# Patient Record
Sex: FEMALE | Race: WHITE | Employment: UNEMPLOYED | URBAN - METROPOLITAN AREA
[De-identification: names, ages, dates, MRNs, and addresses within clinical notes are randomized per-mention and may not be internally consistent; named-entity substitution may affect disease eponyms.]

---

## 2017-05-15 ENCOUNTER — HOSPITAL ENCOUNTER (OUTPATIENT)
Dept: MAMMOGRAPHY | Age: 62
Discharge: HOME | End: 2017-05-15
Attending: OBSTETRICS & GYNECOLOGY
Payer: COMMERCIAL

## 2017-05-15 DIAGNOSIS — Z12.39 SCREENING FOR BREAST CANCER: ICD-10-CM

## 2017-05-15 PROCEDURE — 77067 SCR MAMMO BI INCL CAD: CPT

## 2017-08-08 PROBLEM — R31.29 MICROSCOPIC HEMATURIA: Status: ACTIVE | Noted: 2017-08-08

## 2017-09-06 ENCOUNTER — HOSPITAL ENCOUNTER (OUTPATIENT)
Dept: PHYSICAL THERAPY | Age: 62
Discharge: HOME | End: 2017-09-06
Attending: OBSTETRICS & GYNECOLOGY
Payer: COMMERCIAL

## 2017-09-06 DIAGNOSIS — N99.3 PROLAPSE OF VAGINAL VAULT AFTER HYSTERECTOMY: ICD-10-CM

## 2017-09-06 PROCEDURE — 97161 PT EVAL LOW COMPLEX 20 MIN: CPT

## 2017-09-06 NOTE — PROGRESS NOTES
Ambulatory/Rehab Services H2 Model Falls Risk Assessment    Risk Factor Pts. ·   Confusion/Disorientation/Impulsivity  []    4 ·   Symptomatic Depression  []   2 ·   Altered Elimination  []   1 ·   Dizziness/Vertigo  []   1 ·   Gender (Male)  []   1 ·   Any administered antiepileptics (anticonvulsants):  []   2 ·   Any administered benzodiazepines:  []   1 ·   Visual Impairment (specify):  []   1 ·   Portable Oxygen Use  []   1 ·   Orthostatic ? BP  []   1 ·   History of Recent Falls (within 3 mos.)  []   5     Ability to Rise from Chair (choose one) Pts. ·   Ability to rise in a single movement  [x]   0 ·   Pushes up, successful in one attempt  []   1 ·   Multiple attempts, but successful  []   3 ·   Unable to rise without assistance  []   4   Total: (5 or greater = High Risk) 0     Falls Prevention Plan:   []                Physical Limitations to Exercise (specify):   []                Mobility Assistance Device (type):   []                Exercise/Equipment Adaptation (specify):    ©2010 [employer] of [address] Patent #[id number].  Federal Law prohibits the replication, distribution or use without written permission from [employer]

## 2017-09-06 NOTE — PROGRESS NOTES
[facility]  : 1955 [facility] [place] [id number] [facility], [address] [place] [address],[address], [place] [address].  [phone]   [phone]          OUTPATIENT PHYSICAL THERAPY:Initial Assessment 2017    ICD-10: Treatment Diagnosis: Muscle weakness (generalized) (M62.81)  Precautions/Allergies:   Review of patient's allergies indicates no known allergies. Fall Risk Score: 0 (? 5 = High Risk)  MD Orders: Evaluate and treat MEDICAL/REFERRING DIAGNOSIS:  Prolapse of vaginal vault after hysterectomy [N99.3]   DATE OF ONSET: 6 mos  REFERRING PHYSICIAN: Jose Clements MD  RETURN PHYSICIAN APPOINTMENT: unknown     INITIAL ASSESSMENT:  Ms. Aguilar presents with decreased strength and endurance of PFmm with PERF score of 2/3/5 likely contributing to her lack of vaginal vault support s/p hysterectomy. She has tendency of breath holding and use of abdominal accessory muscles when performing kegel but able to correct with cues. Pt will benefit from physical therapy to address stated problems. Plan of care was discussed and agreed upon with patient and HEP was initiated. Thank you for the opportunity to work with this patient. PROBLEM LIST (Impacting functional limitations):  1. Decreased strength of pelvic floor which limits bladder control INTERVENTIONS PLANNED:  1. Neuromuscular re-education  2. Biofeedback as needed  3. HEP  4. Bladder retraining  5. Bladder education  6. Manual Therapy  7. Therapeutic Activites  8. Therapeutic Exercise/Strengthening   TREATMENT PLAN:  Effective Dates: 17 TO 17. Frequency/Duration: 1 time a week for 12 weeks  GOALS: (Goals have been discussed and agreed upon with patient.)  Short-Term Functional Goals: Time Frame: 4 weeks  1. Patient will demonstrate independence with home exercise program.  2. Pt will demonstrate ability to perform isolated PFmm contraction without overflow accessory use for improved efficiency strengthening.   Discharge Goals: Time Frame: 12 weeks  1. Pt will score 4-5 on PFIQ7 for overall functional improvement. 2. Pt will demonstrate improvement in PFmm strength to 3/10/10 for improved vaginal vault support. 3. Pt will report 75% improvement in prolapse sensations with lifting and exercise to allow her to care for grandchildren without sxm exacerbation  Rehabilitation Potential For Stated Goals: Good  Regarding [address] therapy, I certify that the treatment plan above will be carried out by a therapist or under their direction. Thank you for this referral,  Lance Lebron PT     Referring Physician Signature: Molly Valenzuela MD              Date                    The information in this section was collected on 09/06/17  (except where otherwise noted). HISTORY:   History of Present Injury/Illness (Reason for Referral):  Hysterectomy due to prolapsed uterus 12/16. She states she normally doesn't have a problem with leaking but can have some difficulty making it to the bathroom in time. After hysterectomy she felt great until recently. Had grandchildren visit and lifted them a lot which she feels aggravated symptoms. She states she can sometimes stop flow of urine and sometimes can't. Also sometimes stream is decreased but this fluctuates. Trying to avoid surgery. Past Medical History/Comorbidities:   Ms. Aguilar  has a past medical history of Chicken pox; Depression; H/O abnormal Pap smear; Mumps; Uterine prolapse; and Vaginal venereal warts. Ms. Aguilar  has a past surgical history that includes orthopaedic; colonoscopy; gyn; and hysterectomy (12/12/2016). Social History/Living Environment:     lives with   Prior Level of Function/Work/Activity:  Not working. 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 Doesn't do aerobic ex's bc feels it makes her problem worse.     Previous Treatment Approaches:          Prior hysterectomy for prolapse  Personal Factors:          Sex:  female        Age:  57 y.o.      Current Medications:    Current Outpatient Prescriptions:     estradiol (ESTRACE) 1 mg tablet, TAKE 1 TABLET DAILY, Disp: 90 Tab, Rfl: 4    omega-3 fatty acids-vitamin e (FISH OIL) 1,000 mg cap, Take 2 Caps by mouth daily. Per anesthesia protocol: pt instructed to stop med 7 days prior to day of surgery. , Disp: , Rfl:     Biotin 2,500 mcg cap, Take 1 Tab by mouth daily. Per anesthesia protocol: pt instructed to stop med 7 days prior to day of surgery. , Disp: , Rfl:    Date Last Reviewed:  09/06/17   Gynecological History:   · Number of pregnancies: 2, vaginal 2, C-sections 0  · Episiotomy: yes on both. Larger tearing with first delivery  Past Urinary Medical History:    · History of UTI, Menopause: no hx of UTI; finished menopause 15 yrs ago  · Previous Treatments: hysterectomy  Incontinence History:  PROBLEM: YES/NO: COMMENTS:   Loss of urine with coughing NO    Loss of urine with lifting  NO    Loss of urine with exercise, running NO    Loss of urine with strong urge NO    Loss of urine with approaching the bathroom NO    Loss of urine with key in lock NO    Loss of urine as getting to toilet/removing clothing NO    Loss of urine when hearing running water NO    Have difficulty initiating a urine stream NO    Have difficulty stopping urine stream YES    Have to strain to empty bladder NO Sometimes slow stream   Dribble urine when urinating NO    Dribble urine after emptying bladder NO    Experience pain with urination NO    Experience burning during urination NO    Have blood in urine NO      Voiding Patterns:  Patient voids every 2 hours during the day and 1 times during the night. Patient reports that she empties bladder fully. Fluid Intake:  Patient drinks 6-8 cups of fluid per day. She consumes 1-2 cups of caffeinated beverages per day. Patient does not limit fluid intake to control bladder. Bowel Habits:  Patient demonstrates normal bowel habits.   Mobility / Self Care: independent  Personal / Social History:  · Sexually active? YES:   · Social activities restricted due to urinary incontinence? YES:       Number of Personal Factors/Comorbidities that affect the Plan of Care: 1-2: MODERATE COMPLEXITY   EXAMINATION:   External Observation:   · Voluntary Contraction: present  · Voluntary Relaxation: present  · Involuntary Contraction: present  · Involuntary Relaxation: present  · Perineal Body Assessment: mild thinning  · Skin Integrity: normal  · Q-tip Test: no tenderness  · Vaginal Vault Size: within normal limits    [place] PERFECT (Power/Endurnace/Repetitions/Fast Twitch/Elevation/Co-contraction/Timing) Scale:   · [place] PERFECT = 2/3/5/9//  · Tissue support test with bearing down:  Grade 1: not visible at introitus; Grade 2: visible at introitus; Grade 3: tissue outside introitus  · Anterior Wall = 1   · Posterior Wall = 1   · Apical = na   · Palpation:no tenderness   Right Left   Bulbocavernosus     Ischocavernosus     Superficial Transverse Perineal     Sphincter Urethrae     Compressor Urethra      Urethra-vaginalis     Deep Transverse Perineium     Obturator Internus     Iliococcygeus     Coccygeus     Pubococcygeus     Levator Ani     Adductor     Psoas           Body Structures Involved:  1. Nerves  2. Muscles  3. Ligaments Body Functions Affected:  1. Mental  2. Sensory/Pain  3. Genitourinary  4. Neuromusculoskeletal  5. Movement Related Activities and Participation Affected:  1. Learning and Applying Knowledge  2. General Tasks and Demands  3. Mobility  4. Self Care  5. Interpersonal Interactions and Relationships   Number of elements that affect the Plan of Care: 1-2: LOW COMPLEXITY   CLINICAL PRESENTATION:   Presentation: Stable and uncomplicated: LOW COMPLEXITY   CLINICAL DECISION MAKING:   Outcome Measure:    Tool Used: Pelvic Floor Impact Questionnaire--short form 7 (PFIQ-7)   Score:  Initial: 9.52%  · Bladder or Urine: 9.52  · Bowel or Rectum: 9.52  · Vagina or Pelvis: 9.52 Most Recent: X (Date: -- )  · Bladder or Urine: X  · Bowel or Rectum: X  · Vagina or Pelvis: X   Interpretation of Score: Each of the 7 sections is scored on a scale from 0-3; 0 representing \"Not at all\", 3 representing \"Quite a bit\". The mean value is taken from all the answered items, then multiplied by 100 to obtain the scale score, ranging from 0-100. This process is repeated for each column representing bowel, bladder, and pelvic pain. ? Self Care:     - CURRENT STATUS: CI - 1%-19% impaired, limited or restricted    - GOAL STATUS: CI - 1%-19% impaired, limited or restricted    - D/C STATUS:  ---------------To be determined---------------       Medical Necessity:   · Patient demonstrates good rehab potential due to higher previous functional level. Reason for Services/Other Comments:  · Patient continues to require skilled intervention due to ongoing goals noted above. Use of outcome tool(s) and clinical judgement create a POC that gives a: Clear prediction of patient's progress: LOW COMPLEXITY   TREATMENT:   (In addition to Assessment/Re-Assessment sessions the following treatments were rendered)  Pre-treatment Symptoms/Complaints:  C/o difficulty caring for her grandkids and picking them up d/t prolapse symptoms  Pain: Initial:   Pain Intensity 1: 0  Post Session:  0     THERAPEUTIC EXERCISE: (  minutes):  Exercises per grid below to improve strength and coordination. Required minimal verbal and tactile cues to promote proper body mechanics and promote proper body breathing techniques. Progressed resistance and repetitions as indicated.     Date:  9/6/17 Date:   Date:     Activity/Exercise Parameters Parameters Parameters   Isometric PFmm 5sec 10x                                              Exercises:  Patient instructed in pelvic floor exercises listed below:  5/10 TID  The following educational topics were reviewed with patient:  Bladder health, tips to control urge, bladder diary, pelvic floor anatomy, how foods affect bladder, bladder retraining. Treatment/Session Assessment:    Response to Treatment:  Pt reported good understanding of plan of care as well as exercises. All questions were answered and pt was invited to call with any further questions or issues  · Compliance with Program/Exercises: Will assess as treatment progresses. · Recommendations/Intent for next treatment session: \"Next visit will focus on advancements to more challenging activities\".   Total Treatment Duration:  PT Patient Time In/Time Out  Time In: 1330  Time Out: 2919 [facility]

## 2017-09-22 ENCOUNTER — HOSPITAL ENCOUNTER (OUTPATIENT)
Dept: PHYSICAL THERAPY | Age: 62
Discharge: HOME | End: 2017-09-22
Attending: OBSTETRICS & GYNECOLOGY
Payer: COMMERCIAL

## 2017-09-22 DIAGNOSIS — N99.3 PROLAPSE OF VAGINAL VAULT AFTER HYSTERECTOMY: ICD-10-CM

## 2017-09-22 PROCEDURE — 97110 THERAPEUTIC EXERCISES: CPT

## 2017-09-22 NOTE — PROGRESS NOTES
[facility]  : 1955 Therapy Center at [place] [address] [facility], [address] [place] [address],[address], [facility]  [phone]   [phone]          OUTPATIENT PHYSICAL THERAPY:Daily Note 2017    ICD-10: Treatment Diagnosis: Muscle weakness (generalized) (M62.81)  Precautions/Allergies:   Review of patient's allergies indicates no known allergies. Fall Risk Score: 0 (? 5 = High Risk)  MD Orders: Evaluate and treat MEDICAL/REFERRING DIAGNOSIS:  Prolapse of vaginal vault after hysterectomy [N99.3]   DATE OF ONSET: 6 mos  REFERRING PHYSICIAN: Tate Clements MD  RETURN PHYSICIAN APPOINTMENT: unknown     INITIAL ASSESSMENT:  Ms. Aguilar presents with decreased strength and endurance of PFmm with PERF score of 2/3/5 likely contributing to her lack of vaginal vault support s/p hysterectomy. She has tendency of breath holding and use of abdominal accessory muscles when performing kegel but able to correct with cues. Pt will benefit from physical therapy to address stated problems. Plan of care was discussed and agreed upon with patient and HEP was initiated. Thank you for the opportunity to work with this patient. PROBLEM LIST (Impacting functional limitations):  1. Decreased strength of pelvic floor which limits bladder control INTERVENTIONS PLANNED:  1. Neuromuscular re-education  2. Biofeedback as needed  3. HEP  4. Bladder retraining  5. Bladder education  6. Manual Therapy  7. Therapeutic Activites  8. Therapeutic Exercise/Strengthening   TREATMENT PLAN:  Effective Dates: 17 TO 17. Frequency/Duration: 1 time a week for 12 weeks  GOALS: (Goals have been discussed and agreed upon with patient.)  Short-Term Functional Goals: Time Frame: 4 weeks  1. Patient will demonstrate independence with home exercise program.  2. Pt will demonstrate ability to perform isolated PFmm contraction without overflow accessory use for improved efficiency strengthening.   Discharge Goals: Time Frame: 12 weeks  1. Pt will score 4-5 on PFIQ7 for overall functional improvement. 2. Pt will demonstrate improvement in PFmm strength to 3/10/10 for improved vaginal vault support. 3. Pt will report 75% improvement in prolapse sensations with lifting and exercise to allow her to care for grandchildren without sxm exacerbation  Rehabilitation Potential For Stated Goals: Good  Regarding [address] therapy, I certify that the treatment plan above will be carried out by a therapist or under their direction. Thank you for this referral,  Gage Sewell PT     Referring Physician Signature: Stacie Richmond MD              Date                    The information in this section was collected on 09/22/17  (except where otherwise noted). HISTORY:   History of Present Injury/Illness (Reason for Referral):  Hysterectomy due to prolapsed uterus 12/16. She states she normally doesn't have a problem with leaking but can have some difficulty making it to the bathroom in time. After hysterectomy she felt great until recently. Had grandchildren visit and lifted them a lot which she feels aggravated symptoms. She states she can sometimes stop flow of urine and sometimes can't. Also sometimes stream is decreased but this fluctuates. Trying to avoid surgery. Past Medical History/Comorbidities:   Ms. [facility]  has a past medical history of Chicken pox; Depression; H/O abnormal Pap smear; Mumps; Uterine prolapse; and Vaginal venereal warts. Ms. [facility]  has a past surgical history that includes orthopaedic; colonoscopy; gyn; and hysterectomy (12/12/2016). Social History/Living Environment:     lives with   Prior Level of Function/Work/Activity:  Not working. 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 Doesn't do aerobic ex's bc feels it makes her problem worse.     Previous Treatment Approaches:          Prior hysterectomy for prolapse  Personal Factors:          Sex:  female        Age:  57 y.o.      Current Medications:    Current Outpatient Prescriptions:     estradiol (ESTRACE) 1 mg tablet, TAKE 1 TABLET DAILY, Disp: 90 Tab, Rfl: 4    omega-3 fatty acids-vitamin e (FISH OIL) 1,000 mg cap, Take 2 Caps by mouth daily. Per anesthesia protocol: pt instructed to stop med 7 days prior to day of surgery. , Disp: , Rfl:     Biotin 2,500 mcg cap, Take 1 Tab by mouth daily. Per anesthesia protocol: pt instructed to stop med 7 days prior to day of surgery. , Disp: , Rfl:    Date Last Reviewed:  09/22/17   Gynecological History:   · Number of pregnancies: 2, vaginal 2, C-sections 0  · Episiotomy: yes on both. Larger tearing with first delivery  Past Urinary Medical History:    · History of UTI, Menopause: no hx of UTI; finished menopause 15 yrs ago  · Previous Treatments: hysterectomy  Incontinence History:  PROBLEM: YES/NO: COMMENTS:   Loss of urine with coughing NO    Loss of urine with lifting  NO    Loss of urine with exercise, running NO    Loss of urine with strong urge NO    Loss of urine with approaching the bathroom NO    Loss of urine with key in lock NO    Loss of urine as getting to toilet/removing clothing NO    Loss of urine when hearing running water NO    Have difficulty initiating a urine stream NO    Have difficulty stopping urine stream YES    Have to strain to empty bladder NO Sometimes slow stream   Dribble urine when urinating NO    Dribble urine after emptying bladder NO    Experience pain with urination NO    Experience burning during urination NO    Have blood in urine NO      Voiding Patterns:  Patient voids every 2 hours during the day and 1 times during the night. Patient reports that she empties bladder fully. Fluid Intake:  Patient drinks 6-8 cups of fluid per day. She consumes 1-2 cups of caffeinated beverages per day. Patient does not limit fluid intake to control bladder. Bowel Habits:  Patient demonstrates normal bowel habits.   Mobility / Self Care: independent  Personal / Social History:  · Sexually active? YES:   · Social activities restricted due to urinary incontinence? YES:       Number of Personal Factors/Comorbidities that affect the Plan of Care: 1-2: MODERATE COMPLEXITY   EXAMINATION:   External Observation:   · Voluntary Contraction: present  · Voluntary Relaxation: present  · Involuntary Contraction: present  · Involuntary Relaxation: present  · Perineal Body Assessment: mild thinning  · Skin Integrity: normal  · Q-tip Test: no tenderness  · Vaginal Vault Size: within normal limits    [place] PERFECT (Power/Endurnace/Repetitions/Fast Twitch/Elevation/Co-contraction/Timing) Scale:   · [place] PERFECT = 2/3/5/9//  · Tissue support test with bearing down:  Grade 1: not visible at introitus; Grade 2: visible at introitus; Grade 3: tissue outside introitus  · Anterior Wall = 1   · Posterior Wall = 1   · Apical = na   · Palpation:no tenderness   Right Left   Bulbocavernosus     Ischocavernosus     Superficial Transverse Perineal     Sphincter Urethrae     Compressor Urethra      Urethra-vaginalis     Deep Transverse Perineium     Obturator Internus     Iliococcygeus     Coccygeus     Pubococcygeus     Levator Ani     Adductor     Psoas           Body Structures Involved:  1. Nerves  2. Muscles  3. Ligaments Body Functions Affected:  1. Mental  2. Sensory/Pain  3. Genitourinary  4. Neuromusculoskeletal  5. Movement Related Activities and Participation Affected:  1. Learning and Applying Knowledge  2. General Tasks and Demands  3. Mobility  4. Self Care  5. Interpersonal Interactions and Relationships   Number of elements that affect the Plan of Care: 1-2: LOW COMPLEXITY   CLINICAL PRESENTATION:   Presentation: Stable and uncomplicated: LOW COMPLEXITY   CLINICAL DECISION MAKING:   Outcome Measure:    Tool Used: Pelvic Floor Impact Questionnaire--short form 7 (PFIQ-7)   Score:  Initial: 9.52%  · Bladder or Urine: 9.52  · Bowel or Rectum: 9.52  · Vagina or Pelvis: 9.52 Most Recent: X (Date: -- )  · Bladder or Urine: X  · Bowel or Rectum: X  · Vagina or Pelvis: X   Interpretation of Score: Each of the 7 sections is scored on a scale from 0-3; 0 representing \"Not at all\", 3 representing \"Quite a bit\". The mean value is taken from all the answered items, then multiplied by 100 to obtain the scale score, ranging from 0-100. This process is repeated for each column representing bowel, bladder, and pelvic pain. ? Self Care:     - CURRENT STATUS: CI - 1%-19% impaired, limited or restricted    - GOAL STATUS: CI - 1%-19% impaired, limited or restricted    - D/C STATUS:  ---------------To be determined---------------       Medical Necessity:   · Patient demonstrates good rehab potential due to higher previous functional level. Reason for Services/Other Comments:  · Patient continues to require skilled intervention due to ongoing goals noted above. Use of outcome tool(s) and clinical judgement create a POC that gives a: Clear prediction of patient's progress: LOW COMPLEXITY   TREATMENT:   (In addition to Assessment/Re-Assessment sessions the following treatments were rendered)  Pre-treatment Symptoms/Complaints:  States she is doing ex's. No significant change in prolapse symptoms noted  Pain: Initial:   Pain Intensity 1: 0  Post Session:  0     THERAPEUTIC EXERCISE: (45 minutes):  Exercises per grid below to improve strength and coordination. Required minimal verbal and tactile cues to promote proper body mechanics and promote proper body breathing techniques. Progressed resistance and repetitions as indicated.     Date:  9/6/17 Date:  9/22/17 Date:     Activity/Exercise Parameters Parameters Parameters   Isometric PFmm 5sec 10x biofeedback    Bridge with ball squeeze  10x    Bridge with isometric ER  BTB 10x    Sidelying clams with TB      squats                   biofeedback utilized to assess resting tone, 10/10, 2/4, modulation ex  NMES utilized for increasing muscle strength and hypertrophy 10' 10/10, 50Hz    Exercises:  Patient instructed in pelvic floor exercises listed below:  5/10 TID, 2/4  The following educational topics were reviewed with patient:  Bladder health, tips to control urge, bladder diary, pelvic floor anatomy, how foods affect bladder, bladder retraining. Treatment/Session Assessment:    · Response to Treatment:  Tendency to breath hold today with biofeedback. Improved with cues but needed much concentration. Tolerated initiation of NMES well. Issued BTB and ex's today for HEP. Assess prolonged response  · Compliance with Program/Exercises: Will assess as treatment progresses. · Recommendations/Intent for next treatment session: \"Next visit will focus on advancements to more challenging activities\".   Total Treatment Duration:  PT Patient Time In/Time Out  Time In: 1000  Time Out: 1100  Alex Woodruff PT

## 2017-09-29 ENCOUNTER — APPOINTMENT (OUTPATIENT)
Dept: PHYSICAL THERAPY | Age: 62
End: 2017-09-29
Attending: OBSTETRICS & GYNECOLOGY
Payer: COMMERCIAL

## 2017-10-06 ENCOUNTER — HOSPITAL ENCOUNTER (OUTPATIENT)
Dept: PHYSICAL THERAPY | Age: 62
Discharge: HOME | End: 2017-10-06
Attending: OBSTETRICS & GYNECOLOGY
Payer: COMMERCIAL

## 2017-10-06 DIAGNOSIS — N99.3 PROLAPSE OF VAGINAL VAULT AFTER HYSTERECTOMY: ICD-10-CM

## 2017-10-06 PROCEDURE — 97110 THERAPEUTIC EXERCISES: CPT

## 2017-10-06 NOTE — PROGRESS NOTES
[facility] canceled her 10.20 and 63.96 d/t a conflict. .  She rescheduled in November.

## 2017-10-06 NOTE — PROGRESS NOTES
[facility]  : 1955 [facility] Center at [facility]  [address] [address], [place] [address].  [phone]   [phone]          OUTPATIENT PHYSICAL THERAPY:Daily Note 10/6/2017    ICD-10: Treatment Diagnosis: Muscle weakness (generalized) (M62.81)  Precautions/Allergies:   Review of patient's allergies indicates no known allergies. Fall Risk Score: 0 (? 5 = High Risk)  MD Orders: Evaluate and treat MEDICAL/REFERRING DIAGNOSIS:  Prolapse of vaginal vault after hysterectomy [N99.3]   DATE OF ONSET: 6 mos  REFERRING PHYSICIAN: Heidtman, Dan Severance, MD  RETURN PHYSICIAN APPOINTMENT: unknown     INITIAL ASSESSMENT:  Ms. Korey Villalpando presents with decreased strength and endurance of PFmm with PERF score of 2/3/5 likely contributing to her lack of vaginal vault support s/p hysterectomy. She has tendency of breath holding and use of abdominal accessory muscles when performing kegel but able to correct with cues. Pt will benefit from physical therapy to address stated problems. Plan of care was discussed and agreed upon with patient and HEP was initiated. Thank you for the opportunity to work with this patient. PROBLEM LIST (Impacting functional limitations):  1. Decreased strength of pelvic floor which limits bladder control INTERVENTIONS PLANNED:  1. Neuromuscular re-education  2. Biofeedback as needed  3. HEP  4. Bladder retraining  5. Bladder education  6. Manual Therapy  7. Therapeutic Activites  8. Therapeutic Exercise/Strengthening   TREATMENT PLAN:  Effective Dates: 17 TO 17. Frequency/Duration: 1 time a week for 12 weeks  GOALS: (Goals have been discussed and agreed upon with patient.)  Short-Term Functional Goals: Time Frame: 4 weeks  1. Patient will demonstrate independence with home exercise program.  2. Pt will demonstrate ability to perform isolated PFmm contraction without overflow accessory use for improved efficiency strengthening.   Discharge Goals: Time Frame: 12 weeks  1. Pt will score 4-5 on PFIQ7 for overall functional improvement. 2. Pt will demonstrate improvement in PFmm strength to 3/10/10 for improved vaginal vault support. 3. Pt will report 75% improvement in prolapse sensations with lifting and exercise to allow her to care for grandchildren without sxm exacerbation  Rehabilitation Potential For Stated Goals: Good  Regarding [address] therapy, I certify that the treatment plan above will be carried out by a therapist or under their direction. Thank you for this referral,  Ava Valdez PT     Referring Physician Signature: Imelda Hernandez MD              Date                    The information in this section was collected on 10/06/17  (except where otherwise noted). HISTORY:   History of Present Injury/Illness (Reason for Referral):  Hysterectomy due to prolapsed uterus 12/16. She states she normally doesn't have a problem with leaking but can have some difficulty making it to the bathroom in time. After hysterectomy she felt great until recently. Had grandchildren visit and lifted them a lot which she feels aggravated symptoms. She states she can sometimes stop flow of urine and sometimes can't. Also sometimes stream is decreased but this fluctuates. Trying to avoid surgery. Past Medical History/Comorbidities:   Ms. Mauri Riddle  has a past medical history of Chicken pox; Depression; H/O abnormal Pap smear; Mumps; Uterine prolapse; and Vaginal venereal warts. Ms. Mauri Riddle  has a past surgical history that includes orthopaedic; colonoscopy; gyn; and hysterectomy (12/12/2016). Social History/Living Environment:     lives with   Prior Level of Function/Work/Activity:  Not working. 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 Doesn't do aerobic ex's bc feels it makes her problem worse.     Previous Treatment Approaches:          Prior hysterectomy for prolapse  Personal Factors:          Sex:  female        Age:  57 y.o.      Current Medications:    Current Outpatient Prescriptions:     estradiol (ESTRACE) 1 mg tablet, TAKE 1 TABLET DAILY, Disp: 90 Tab, Rfl: 4    omega-3 fatty acids-vitamin e (FISH OIL) 1,000 mg cap, Take 2 Caps by mouth daily. Per anesthesia protocol: pt instructed to stop med 7 days prior to day of surgery. , Disp: , Rfl:     Biotin 2,500 mcg cap, Take 1 Tab by mouth daily. Per anesthesia protocol: pt instructed to stop med 7 days prior to day of surgery. , Disp: , Rfl:    Date Last Reviewed:  10/06/17   Gynecological History:   · Number of pregnancies: 2, vaginal 2, C-sections 0  · Episiotomy: yes on both. Larger tearing with first delivery  Past Urinary Medical History:    · History of UTI, Menopause: no hx of UTI; finished menopause 15 yrs ago  · Previous Treatments: hysterectomy  Incontinence History:  PROBLEM: YES/NO: COMMENTS:   Loss of urine with coughing NO    Loss of urine with lifting  NO    Loss of urine with exercise, running NO    Loss of urine with strong urge NO    Loss of urine with approaching the bathroom NO    Loss of urine with key in lock NO    Loss of urine as getting to toilet/removing clothing NO    Loss of urine when hearing running water NO    Have difficulty initiating a urine stream NO    Have difficulty stopping urine stream YES    Have to strain to empty bladder NO Sometimes slow stream   Dribble urine when urinating NO    Dribble urine after emptying bladder NO    Experience pain with urination NO    Experience burning during urination NO    Have blood in urine NO      Voiding Patterns:  Patient voids every 2 hours during the day and 1 times during the night. Patient reports that she empties bladder fully. Fluid Intake:  Patient drinks 6-8 cups of fluid per day. She consumes 1-2 cups of caffeinated beverages per day. Patient does not limit fluid intake to control bladder. Bowel Habits:  Patient demonstrates normal bowel habits.   Mobility / Self Care: independent  Personal / Social History:  · Sexually active? YES:   · Social activities restricted due to urinary incontinence? YES:       Number of Personal Factors/Comorbidities that affect the Plan of Care: 1-2: MODERATE COMPLEXITY   EXAMINATION:   External Observation:   · Voluntary Contraction: present  · Voluntary Relaxation: present  · Involuntary Contraction: present  · Involuntary Relaxation: present  · Perineal Body Assessment: mild thinning  · Skin Integrity: normal  · Q-tip Test: no tenderness  · Vaginal Vault Size: within normal limits    [place] PERFECT (Power/Endurnace/Repetitions/Fast Twitch/Elevation/Co-contraction/Timing) Scale:   · [place] PERFECT = 2/3/5/9//  · Tissue support test with bearing down:  Grade 1: not visible at introitus; Grade 2: visible at introitus; Grade 3: tissue outside introitus  · Anterior Wall = 1   · Posterior Wall = 1   · Apical = na   · Palpation:no tenderness   Right Left   Bulbocavernosus     Ischocavernosus     Superficial Transverse Perineal     Sphincter Urethrae     Compressor Urethra      Urethra-vaginalis     Deep Transverse Perineium     Obturator Internus     Iliococcygeus     Coccygeus     Pubococcygeus     Levator Ani     Adductor     Psoas           Body Structures Involved:  1. Nerves  2. Muscles  3. Ligaments Body Functions Affected:  1. Mental  2. Sensory/Pain  3. Genitourinary  4. Neuromusculoskeletal  5. Movement Related Activities and Participation Affected:  1. Learning and Applying Knowledge  2. General Tasks and Demands  3. Mobility  4. Self Care  5. Interpersonal Interactions and Relationships   Number of elements that affect the Plan of Care: 1-2: LOW COMPLEXITY   CLINICAL PRESENTATION:   Presentation: Stable and uncomplicated: LOW COMPLEXITY   CLINICAL DECISION MAKING:   Outcome Measure:    Tool Used: Pelvic Floor Impact Questionnaire--short form 7 (PFIQ-7)   Score:  Initial: 9.52%  · Bladder or Urine: 9.52  · Bowel or Rectum: 9.52  · Vagina or Pelvis: 9.52 Most Recent: X (Date: -- )  · Bladder or Urine: X  · Bowel or Rectum: X  · Vagina or Pelvis: X   Interpretation of Score: Each of the 7 sections is scored on a scale from 0-3; 0 representing \"Not at all\", 3 representing \"Quite a bit\". The mean value is taken from all the answered items, then multiplied by 100 to obtain the scale score, ranging from 0-100. This process is repeated for each column representing bowel, bladder, and pelvic pain. ? Self Care:     - CURRENT STATUS: CI - 1%-19% impaired, limited or restricted    - GOAL STATUS: CI - 1%-19% impaired, limited or restricted    - D/C STATUS:  ---------------To be determined---------------       Medical Necessity:   · Patient demonstrates good rehab potential due to higher previous functional level. Reason for Services/Other Comments:  · Patient continues to require skilled intervention due to ongoing goals noted above. Use of outcome tool(s) and clinical judgement create a POC that gives a: Clear prediction of patient's progress: LOW COMPLEXITY   TREATMENT:   (In addition to Assessment/Re-Assessment sessions the following treatments were rendered)  Pre-treatment Symptoms/Complaints:  States she does not feel pressure of prolapse as much anymore. Can sometimes stop her urine flow and sometimes not. Admits to having difficulty isolating PFmm. Pain: Initial:   Pain Intensity 1: 0  Post Session:  0     THERAPEUTIC EXERCISE: (55 minutes):  Exercises per grid below to improve strength and coordination. Required minimal verbal and tactile cues to promote proper body mechanics and promote proper body breathing techniques. Progressed resistance and repetitions as indicated.     Date:  9/6/17 Date:  9/22/17 Date:  10/6/17   Activity/Exercise Parameters Parameters Parameters   Isometric PFmm 5sec 10x biofeedback    Bridge with ball squeeze  10x 10x   Bridge with isometric ER  BTB 10x BTB 12x   Sidelying clams with TB   BTB 12x squats   10x, 10x w BTB   sidestepping   BTB 1 lap   Sidestep squats   5x, 3# wts 5x   Plank - knee taps, KTC, feet out/in   30sec   Side plank - arm motions   20sec                biofeedback utilized to assess resting tone, 10/10, 2/4, modulation ex  NMES utilized for increasing muscle strength and hypertrophy 10' 10/10, 50Hz    Exercises:  Patient instructed in pelvic floor exercises listed below:  5/10 TID, 2/4  The following educational topics were reviewed with patient:  Bladder health, tips to control urge, bladder diary, pelvic floor anatomy, how foods affect bladder, bladder retraining. Treatment/Session Assessment:    · Response to Treatment: discussed max contraction and tendency to recruit accessory muscles (ie abs and breath holding) counterproductive to effectiveness of kegel. Practiced 50% contractions with larger muscle group ex's tailoring ex's to her home 'floor' routine that she performs 5days/week. · Compliance with Program/Exercises: Will assess as treatment progresses. · Recommendations/Intent for next treatment session: \"Next visit will focus on advancements to more challenging activities\".   Total Treatment Duration:  PT Patient Time In/Time Out  Time In: 1000  Time Out: 1100  Chadd Arnold, PT

## 2017-10-11 ENCOUNTER — APPOINTMENT (OUTPATIENT)
Dept: PHYSICAL THERAPY | Age: 62
End: 2017-10-11
Attending: OBSTETRICS & GYNECOLOGY
Payer: COMMERCIAL

## 2017-10-13 ENCOUNTER — HOSPITAL ENCOUNTER (OUTPATIENT)
Dept: PHYSICAL THERAPY | Age: 62
Discharge: HOME | End: 2017-10-13
Attending: OBSTETRICS & GYNECOLOGY
Payer: COMMERCIAL

## 2017-10-13 PROCEDURE — 97110 THERAPEUTIC EXERCISES: CPT

## 2017-10-13 NOTE — PROGRESS NOTES
[facility]  : 1955 Therapy Center at [place] [id number] [facility], [address] [place] [address],[address], [facility] SAUD Mora.  [phone]   [phone]          OUTPATIENT PHYSICAL THERAPY:Daily Note 10/13/2017    ICD-10: Treatment Diagnosis: Muscle weakness (generalized) (M62.81)  Precautions/Allergies:   Review of patient's allergies indicates no known allergies. Fall Risk Score: 0 (? 5 = High Risk)  MD Orders: Evaluate and treat MEDICAL/REFERRING DIAGNOSIS:  urinary   DATE OF ONSET: 6 mos  REFERRING PHYSICIAN: Rosmery Faulkner MD  RETURN PHYSICIAN APPOINTMENT: unknown     INITIAL ASSESSMENT:  Ms. [address] presents with decreased strength and endurance of PFmm with PERF score of 2/3/5 likely contributing to her lack of vaginal vault support s/p hysterectomy. She has tendency of breath holding and use of abdominal accessory muscles when performing kegel but able to correct with cues. Pt will benefit from physical therapy to address stated problems. Plan of care was discussed and agreed upon with patient and HEP was initiated. Thank you for the opportunity to work with this patient. PROBLEM LIST (Impacting functional limitations):  1. Decreased strength of pelvic floor which limits bladder control INTERVENTIONS PLANNED:  1. Neuromuscular re-education  2. Biofeedback as needed  3. HEP  4. Bladder retraining  5. Bladder education  6. Manual Therapy  7. Therapeutic Activites  8. Therapeutic Exercise/Strengthening   TREATMENT PLAN:  Effective Dates: 17 TO 17. Frequency/Duration: 1 time a week for 12 weeks  GOALS: (Goals have been discussed and agreed upon with patient.)  Short-Term Functional Goals: Time Frame: 4 weeks  1. Patient will demonstrate independence with home exercise program.  2. Pt will demonstrate ability to perform isolated PFmm contraction without overflow accessory use for improved efficiency strengthening. Discharge Goals: Time Frame: 12 weeks  1.  Pt will score 4-5 on PFIQ7 for overall functional improvement. 2. Pt will demonstrate improvement in PFmm strength to 3/10/10 for improved vaginal vault support. 3. Pt will report 75% improvement in prolapse sensations with lifting and exercise to allow her to care for grandchildren without sxm exacerbation  Rehabilitation Potential For Stated Goals: Good  Regarding [address] therapy, I certify that the treatment plan above will be carried out by a therapist or under their direction. Thank you for this referral,  Maurice Shah PT     Referring Physician Signature: Breanna Vega MD              Date                    The information in this section was collected on 10/13/17  (except where otherwise noted). HISTORY:   History of Present Injury/Illness (Reason for Referral):  Hysterectomy due to prolapsed uterus 12/16. She states she normally doesn't have a problem with leaking but can have some difficulty making it to the bathroom in time. After hysterectomy she felt great until recently. Had grandchildren visit and lifted them a lot which she feels aggravated symptoms. She states she can sometimes stop flow of urine and sometimes can't. Also sometimes stream is decreased but this fluctuates. Trying to avoid surgery. Past Medical History/Comorbidities:   Ms. Eloy Plasencia  has a past medical history of Chicken pox; Depression; H/O abnormal Pap smear; Mumps; Uterine prolapse; and Vaginal venereal warts. Ms. Eloy Plasencia  has a past surgical history that includes orthopaedic; colonoscopy; gyn; and hysterectomy (12/12/2016). Social History/Living Environment:     lives with   Prior Level of Function/Work/Activity:  Not working. 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 Doesn't do aerobic ex's bc feels it makes her problem worse. Previous Treatment Approaches:          Prior hysterectomy for prolapse  Personal Factors:          Sex:  female        Age:  57 y.o.       Current Medications:    Current Outpatient Prescriptions:     estradiol (ESTRACE) 1 mg tablet, TAKE 1 TABLET DAILY, Disp: 90 Tab, Rfl: 4    omega-3 fatty acids-vitamin e (FISH OIL) 1,000 mg cap, Take 2 Caps by mouth daily. Per anesthesia protocol: pt instructed to stop med 7 days prior to day of surgery. , Disp: , Rfl:     Biotin 2,500 mcg cap, Take 1 Tab by mouth daily. Per anesthesia protocol: pt instructed to stop med 7 days prior to day of surgery. , Disp: , Rfl:    Date Last Reviewed:  10/13/17   Gynecological History:   · Number of pregnancies: 2, vaginal 2, C-sections 0  · Episiotomy: yes on both. Larger tearing with first delivery  Past Urinary Medical History:    · History of UTI, Menopause: no hx of UTI; finished menopause 15 yrs ago  · Previous Treatments: hysterectomy  Incontinence History:  PROBLEM: YES/NO: COMMENTS:   Loss of urine with coughing NO    Loss of urine with lifting  NO    Loss of urine with exercise, running NO    Loss of urine with strong urge NO    Loss of urine with approaching the bathroom NO    Loss of urine with key in lock NO    Loss of urine as getting to toilet/removing clothing NO    Loss of urine when hearing running water NO    Have difficulty initiating a urine stream NO    Have difficulty stopping urine stream YES    Have to strain to empty bladder NO Sometimes slow stream   Dribble urine when urinating NO    Dribble urine after emptying bladder NO    Experience pain with urination NO    Experience burning during urination NO    Have blood in urine NO      Voiding Patterns:  Patient voids every 2 hours during the day and 1 times during the night. Patient reports that she empties bladder fully. Fluid Intake:  Patient drinks 6-8 cups of fluid per day. She consumes 1-2 cups of caffeinated beverages per day. Patient does not limit fluid intake to control bladder. Bowel Habits:  Patient demonstrates normal bowel habits. Mobility / Self Care: independent  Personal / Social History:  · Sexually active?  YES: · Social activities restricted due to urinary incontinence? YES:       Number of Personal Factors/Comorbidities that affect the Plan of Care: 1-2: MODERATE COMPLEXITY   EXAMINATION:   External Observation:   · Voluntary Contraction: present  · Voluntary Relaxation: present  · Involuntary Contraction: present  · Involuntary Relaxation: present  · Perineal Body Assessment: mild thinning  · Skin Integrity: normal  · Q-tip Test: no tenderness  · Vaginal Vault Size: within normal limits    [place] PERFECT (Power/Endurnace/Repetitions/Fast Twitch/Elevation/Co-contraction/Timing) Scale:   · [place] PERFECT = 2/3/5/9//  · Tissue support test with bearing down:  Grade 1: not visible at introitus; Grade 2: visible at introitus; Grade 3: tissue outside introitus  · Anterior Wall = 1   · Posterior Wall = 1   · Apical = na   · Palpation:no tenderness   Right Left   Bulbocavernosus     Ischocavernosus     Superficial Transverse Perineal     Sphincter Urethrae     Compressor Urethra      Urethra-vaginalis     Deep Transverse Perineium     Obturator Internus     Iliococcygeus     Coccygeus     Pubococcygeus     Levator Ani     Adductor     Psoas           Body Structures Involved:  1. Nerves  2. Muscles  3. Ligaments Body Functions Affected:  1. Mental  2. Sensory/Pain  3. Genitourinary  4. Neuromusculoskeletal  5. Movement Related Activities and Participation Affected:  1. Learning and Applying Knowledge  2. General Tasks and Demands  3. Mobility  4. Self Care  5. Interpersonal Interactions and Relationships   Number of elements that affect the Plan of Care: 1-2: LOW COMPLEXITY   CLINICAL PRESENTATION:   Presentation: Stable and uncomplicated: LOW COMPLEXITY   CLINICAL DECISION MAKING:   Outcome Measure:    Tool Used: Pelvic Floor Impact Questionnaireshort form 7 (PFIQ-7)   Score:  Initial: 9.52%  · Bladder or Urine: 9.52  · Bowel or Rectum: 9.52  · Vagina or Pelvis: 9.52 Most Recent: X (Date: -- )  · Bladder or Urine: X  · Bowel or Rectum: X  · Vagina or Pelvis: X   Interpretation of Score: Each of the 7 sections is scored on a scale from 0-3; 0 representing \"Not at all\", 3 representing \"Quite a bit\". The mean value is taken from all the answered items, then multiplied by 100 to obtain the scale score, ranging from 0-100. This process is repeated for each column representing bowel, bladder, and pelvic pain. ? Self Care:     - CURRENT STATUS: CI - 1%-19% impaired, limited or restricted    - GOAL STATUS: CI - 1%-19% impaired, limited or restricted    - D/C STATUS:  ---------------To be determined---------------       Medical Necessity:   · Patient demonstrates good rehab potential due to higher previous functional level. Reason for Services/Other Comments:  · Patient continues to require skilled intervention due to ongoing goals noted above. Use of outcome tool(s) and clinical judgement create a POC that gives a: Clear prediction of patient's progress: LOW COMPLEXITY   TREATMENT:   (In addition to Assessment/Re-Assessment sessions the following treatments were rendered)  Pre-treatment Symptoms/Complaints:  States she is able to hold contraction longer lately but fluctuates. 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 Also not doing ex's over the weekends. Pain: Initial:   Pain Intensity 1: 0  Post Session:  0     THERAPEUTIC EXERCISE: (55 minutes):  Exercises per grid below to improve strength and coordination. Required minimal verbal and tactile cues to promote proper body mechanics and promote proper body breathing techniques. Progressed resistance and repetitions as indicated.     Date:  9/6/17 Date:  9/22/17 Date:  10/6/17 Date:  10/13/17   Activity/Exercise Parameters Parameters Parameters    Isometric PFmm 5sec 10x biofeedback     Bridge with ball squeeze  10x 10x    Bridge with isometric ER  BTB 10x BTB 12x    Sidelying clams with TB   BTB 12x    squats   10x, 10x w BTB 10x, 10x BTB   sidestepping   BTB 1 lap    Sidestep squats   5x, 3# wts 5x    Plank - knee taps, KTC, feet out/in   30sec reviewed   Side plank - arm motions   20sec reviewed   TA    5'   TA w heel slides     15x (BP cuff)   Bent knee fall outs    15x                  biofeedback utilized to assess resting tone, 10/10, 2/4, modulation ex  NMES utilized for increasing muscle strength and hypertrophy 10' 10/10, 50Hz    Exercises:  Patient instructed in pelvic floor exercises listed below:  5/10 TID, 2/4  The following educational topics were reviewed with patient:  Bladder health, tips to control urge, bladder diary, pelvic floor anatomy, how foods affect bladder, bladder retraining. Treatment/Session Assessment:    · Response to Treatment: continue with difficulty performing a kegel and multitasking with dynamic ex's. Good tolerance to TA stab ex's  · Compliance with Program/Exercises: Will assess as treatment progresses. · Recommendations/Intent for next treatment session: \"Next visit will focus on advancements to more challenging activities\".   Total Treatment Duration:  PT Patient Time In/Time Out  Time In: 1230  Time Out: Caleb Don

## 2017-10-20 ENCOUNTER — APPOINTMENT (OUTPATIENT)
Dept: PHYSICAL THERAPY | Age: 62
End: 2017-10-20
Attending: OBSTETRICS & GYNECOLOGY
Payer: COMMERCIAL

## 2017-10-27 ENCOUNTER — APPOINTMENT (OUTPATIENT)
Dept: PHYSICAL THERAPY | Age: 62
End: 2017-10-27
Attending: OBSTETRICS & GYNECOLOGY
Payer: COMMERCIAL

## 2017-11-03 ENCOUNTER — APPOINTMENT (OUTPATIENT)
Dept: PHYSICAL THERAPY | Age: 62
End: 2017-11-03
Attending: OBSTETRICS & GYNECOLOGY

## 2017-11-08 ENCOUNTER — APPOINTMENT (OUTPATIENT)
Dept: PHYSICAL THERAPY | Age: 62
End: 2017-11-08
Attending: OBSTETRICS & GYNECOLOGY

## 2017-11-14 ENCOUNTER — HOSPITAL ENCOUNTER (OUTPATIENT)
Dept: PHYSICAL THERAPY | Age: 62
End: 2017-11-14
Attending: OBSTETRICS & GYNECOLOGY

## 2017-11-20 ENCOUNTER — HOSPITAL ENCOUNTER (OUTPATIENT)
Dept: PHYSICAL THERAPY | Age: 62
End: 2017-11-20
Attending: OBSTETRICS & GYNECOLOGY

## 2017-11-21 ENCOUNTER — APPOINTMENT (OUTPATIENT)
Dept: PHYSICAL THERAPY | Age: 62
End: 2017-11-21
Attending: OBSTETRICS & GYNECOLOGY

## 2018-07-02 ENCOUNTER — HOSPITAL ENCOUNTER (OUTPATIENT)
Dept: MAMMOGRAPHY | Age: 63
Discharge: HOME | End: 2018-07-02
Attending: OBSTETRICS & GYNECOLOGY
Payer: COMMERCIAL

## 2018-07-02 DIAGNOSIS — Z12.31 VISIT FOR SCREENING MAMMOGRAM: ICD-10-CM

## 2018-07-02 PROCEDURE — 77063 BREAST TOMOSYNTHESIS BI: CPT

## 2018-09-17 PROBLEM — N81.10 VAGINAL PROLAPSE: Status: ACTIVE | Noted: 2018-09-17

## 2018-09-17 PROBLEM — R15.2 FECAL URGENCY: Status: ACTIVE | Noted: 2018-09-17

## 2018-10-05 PROBLEM — N39.3 STRESS INCONTINENCE OF URINE: Status: ACTIVE | Noted: 2018-10-05

## 2019-10-18 ENCOUNTER — HOSPITAL ENCOUNTER (OUTPATIENT)
Dept: MAMMOGRAPHY | Age: 64
Discharge: HOME | End: 2019-10-18
Attending: FAMILY MEDICINE
Payer: MEDICARE

## 2019-10-18 DIAGNOSIS — Z12.31 ENCOUNTER FOR SCREENING MAMMOGRAM FOR MALIGNANT NEOPLASM OF BREAST: ICD-10-CM

## 2019-10-18 PROCEDURE — 77063 BREAST TOMOSYNTHESIS BI: CPT

## 2020-09-28 ENCOUNTER — TRANSCRIBE ORDER (OUTPATIENT)
Dept: SCHEDULING | Age: 65
End: 2020-09-28

## 2020-09-28 DIAGNOSIS — Z12.39 SCREENING FOR BREAST CANCER: Primary | ICD-10-CM

## 2021-01-07 ENCOUNTER — APPOINTMENT (RX ONLY)
Dept: URBAN - METROPOLITAN AREA CLINIC 24 | Facility: CLINIC | Age: 66
Setting detail: DERMATOLOGY
End: 2021-01-07

## 2021-01-07 VITALS — TEMPERATURE: 97.3 F

## 2021-01-07 DIAGNOSIS — L70.8 OTHER ACNE: ICD-10-CM

## 2021-01-07 DIAGNOSIS — D485 NEOPLASM OF UNCERTAIN BEHAVIOR OF SKIN: ICD-10-CM

## 2021-01-07 DIAGNOSIS — L82.1 OTHER SEBORRHEIC KERATOSIS: ICD-10-CM

## 2021-01-07 DIAGNOSIS — L57.8 OTHER SKIN CHANGES DUE TO CHRONIC EXPOSURE TO NONIONIZING RADIATION: ICD-10-CM

## 2021-01-07 DIAGNOSIS — Z71.89 OTHER SPECIFIED COUNSELING: ICD-10-CM

## 2021-01-07 DIAGNOSIS — D18.0 HEMANGIOMA: ICD-10-CM

## 2021-01-07 PROBLEM — D18.01 HEMANGIOMA OF SKIN AND SUBCUTANEOUS TISSUE: Status: ACTIVE | Noted: 2021-01-07

## 2021-01-07 PROBLEM — D48.5 NEOPLASM OF UNCERTAIN BEHAVIOR OF SKIN: Status: ACTIVE | Noted: 2021-01-07

## 2021-01-07 PROCEDURE — 99203 OFFICE O/P NEW LOW 30 MIN: CPT | Mod: 25

## 2021-01-07 PROCEDURE — ? COUNSELING

## 2021-01-07 PROCEDURE — 11305 SHAVE SKIN LESION 0.5 CM/<: CPT

## 2021-01-07 PROCEDURE — ? ACNE SURGERY

## 2021-01-07 PROCEDURE — 10040 EXTRACTION: CPT

## 2021-01-07 PROCEDURE — ? SHAVE REMOVAL

## 2021-01-07 ASSESSMENT — LOCATION SIMPLE DESCRIPTION DERM
LOCATION SIMPLE: RIGHT THIGH
LOCATION SIMPLE: RIGHT HAND
LOCATION SIMPLE: LEFT THIGH
LOCATION SIMPLE: ABDOMEN
LOCATION SIMPLE: UPPER BACK
LOCATION SIMPLE: CHEST
LOCATION SIMPLE: RIGHT BREAST

## 2021-01-07 ASSESSMENT — LOCATION DETAILED DESCRIPTION DERM
LOCATION DETAILED: EPIGASTRIC SKIN
LOCATION DETAILED: SUPERIOR THORACIC SPINE
LOCATION DETAILED: LEFT MEDIAL SUPERIOR CHEST
LOCATION DETAILED: RIGHT MEDIAL BREAST 2-3:00 REGION
LOCATION DETAILED: LEFT ANTERIOR PROXIMAL THIGH
LOCATION DETAILED: RIGHT RADIAL DORSAL HAND
LOCATION DETAILED: RIGHT ANTERIOR DISTAL THIGH

## 2021-01-07 ASSESSMENT — LOCATION ZONE DERM
LOCATION ZONE: TRUNK
LOCATION ZONE: LEG
LOCATION ZONE: HAND

## 2021-01-07 NOTE — PROCEDURE: COUNSELING
Detail Level: Zone
Sunscreen Recommendations: Broad spectrum
Detail Level: Simple
Detail Level: Detailed

## 2021-01-07 NOTE — PROCEDURE: SHAVE REMOVAL
Render Path Notes In Note?: No
Path Notes (To The Dermatopathologist): Please check margins.
Medical Necessity Information: It is in your best interest to select a reason for this procedure from the list below. All of these items fulfill various CMS LCD requirements except the new and changing color options.
X Size Of Lesion In Cm (Optional): 0
Accession #: [id number]
Notification Instructions: Patient will be notified of biopsy results. However, patient instructed to call the office if not contacted within 2 weeks.
Size Of Lesion In Cm (Required): 0.5
Hemostasis: Aluminum Chloride
Medical Necessity Clause: This procedure was medically necessary because the lesion is
Post-Care Instructions: I reviewed with the patient in detail post-care instructions. Patient is to keep the biopsy site dry overnight, and then apply bacitracin twice daily until healed. Patient may apply hydrogen peroxide soaks to remove any crusting.
Consent: Verbal consent was obtained from the patient. The risks and benefits to therapy were discussed in detail. Specifically, the risks of infection, scarring, bleeding, prolonged wound healing, incomplete removal, allergy to anesthesia, nerve injury and recurrence were addressed. Prior to the procedure, the treatment site was clearly identified and confirmed by the patient. All components of Universal Protocol/PAUSE Rule completed. KENNETH Mackenzie, ELISHA
Anesthesia Type: 1% lidocaine without epinephrine and a 1:10 solution of 8.4% sodium bicarbonate
Detail Level: Detailed
Billing Type: Third-Party Bill
Wound Care: Petrolatum
Was A Bandage Applied: Yes
Biopsy Method: double edge Personna blade

## 2021-01-07 NOTE — PROCEDURE: ACNE SURGERY
Extraction Method: lancet and extractor
Acne Type: Milial cysts
Consent was obtained and risks were reviewed including but not limited to scarring, infection, bleeding, scabbing, incomplete removal, and allergy to anesthesia.
Render Post-Care Instructions In Note?: no
Post-Care Instructions: I reviewed with the patient in detail post-care instructions. Patient is to keep the treatment areaas dry overnight, and then apply bacitracin twice daily until healed. Patient may apply hydrogen peroxide soaks to remove any crusting.
Detail Level: Detailed
Prep Text (Optional): Prior to removal the treatment areas were prepped in the usual fashion.

## 2021-02-19 ENCOUNTER — HOSPITAL ENCOUNTER (OUTPATIENT)
Dept: LAB | Age: 66
Discharge: HOME | End: 2021-02-19

## 2021-02-19 PROCEDURE — 88305 TISSUE EXAM BY PATHOLOGIST: CPT

## 2021-03-18 ENCOUNTER — TRANSCRIBE ORDER (OUTPATIENT)
Dept: SCHEDULING | Age: 66
End: 2021-03-18

## 2021-03-18 DIAGNOSIS — Z12.31 ENCOUNTER FOR SCREENING MAMMOGRAM FOR MALIGNANT NEOPLASM OF BREAST: Primary | ICD-10-CM

## 2021-07-16 ENCOUNTER — HOSPITAL ENCOUNTER (OUTPATIENT)
Dept: MAMMOGRAPHY | Age: 66
Discharge: HOME | End: 2021-07-16
Attending: FAMILY MEDICINE
Payer: MEDICARE

## 2021-07-16 DIAGNOSIS — Z12.31 ENCOUNTER FOR SCREENING MAMMOGRAM FOR MALIGNANT NEOPLASM OF BREAST: ICD-10-CM

## 2021-07-16 PROCEDURE — 77063 BREAST TOMOSYNTHESIS BI: CPT

## 2021-10-04 ENCOUNTER — APPOINTMENT (RX ONLY)
Dept: URBAN - METROPOLITAN AREA CLINIC 23 | Facility: CLINIC | Age: 66
Setting detail: DERMATOLOGY
End: 2021-10-04

## 2021-10-04 DIAGNOSIS — L259 CONTACT DERMATITIS AND OTHER ECZEMA, UNSPECIFIED CAUSE: ICD-10-CM

## 2021-10-04 PROBLEM — L23.9 ALLERGIC CONTACT DERMATITIS, UNSPECIFIED CAUSE: Status: ACTIVE | Noted: 2021-10-04

## 2021-10-04 PROCEDURE — ? COUNSELING

## 2021-10-04 PROCEDURE — ? PRESCRIPTION MEDICATION MANAGEMENT

## 2021-10-04 PROCEDURE — ? PRESCRIPTION

## 2021-10-04 PROCEDURE — 99213 OFFICE O/P EST LOW 20 MIN: CPT

## 2021-10-04 RX ORDER — TRIAMCINOLONE ACETONIDE 1 MG/G
CREAM TOPICAL BID
Qty: 454 | Refills: 2 | Status: ERX | COMMUNITY
Start: 2021-10-04

## 2021-10-04 RX ADMIN — TRIAMCINOLONE ACETONIDE: 1 CREAM TOPICAL at 00:00

## 2021-10-04 ASSESSMENT — LOCATION DETAILED DESCRIPTION DERM
LOCATION DETAILED: RIGHT LATERAL ABDOMEN
LOCATION DETAILED: LEFT LATERAL ABDOMEN
LOCATION DETAILED: EPIGASTRIC SKIN

## 2021-10-04 ASSESSMENT — LOCATION SIMPLE DESCRIPTION DERM: LOCATION SIMPLE: ABDOMEN

## 2021-10-04 ASSESSMENT — LOCATION ZONE DERM: LOCATION ZONE: TRUNK

## 2021-10-04 NOTE — PROCEDURE: PRESCRIPTION MEDICATION MANAGEMENT
Detail Level: Simple
Plan: Try otc CeraVe itch relief. Since it developed weeks after the surgery, suspect it was from the Polysporin  she was using daily at sites. She claims areas were very red around incision sites.  Getting better she claims but still very itchy.
Initiate Treatment: Wash with gentle cleanser
Render In Strict Bullet Format?: No
Continue Regimen: Zyrtec QAM or may take twice a day , Pepcid twice daily

## 2021-10-04 NOTE — HPI: RASH
What Type Of Note Output Would You Prefer (Optional)?: Standard Output
How Severe Is Your Rash?: moderate
Is This A New Presentation, Or A Follow-Up?: Rash
Additional History: Patient was given a steroid shot.  The patient also had hives on her neck.

## 2022-02-03 ENCOUNTER — APPOINTMENT (RX ONLY)
Dept: URBAN - METROPOLITAN AREA CLINIC 24 | Facility: CLINIC | Age: 67
Setting detail: DERMATOLOGY
End: 2022-02-03

## 2022-02-03 DIAGNOSIS — L50.1 IDIOPATHIC URTICARIA: ICD-10-CM | Status: STABLE

## 2022-02-03 DIAGNOSIS — D18.0 HEMANGIOMA: ICD-10-CM

## 2022-02-03 DIAGNOSIS — L82.0 INFLAMED SEBORRHEIC KERATOSIS: ICD-10-CM

## 2022-02-03 DIAGNOSIS — Z71.89 OTHER SPECIFIED COUNSELING: ICD-10-CM

## 2022-02-03 DIAGNOSIS — D485 NEOPLASM OF UNCERTAIN BEHAVIOR OF SKIN: ICD-10-CM

## 2022-02-03 DIAGNOSIS — L57.8 OTHER SKIN CHANGES DUE TO CHRONIC EXPOSURE TO NONIONIZING RADIATION: ICD-10-CM

## 2022-02-03 DIAGNOSIS — L82.1 OTHER SEBORRHEIC KERATOSIS: ICD-10-CM

## 2022-02-03 PROBLEM — D18.01 HEMANGIOMA OF SKIN AND SUBCUTANEOUS TISSUE: Status: ACTIVE | Noted: 2022-02-03

## 2022-02-03 PROBLEM — D48.5 NEOPLASM OF UNCERTAIN BEHAVIOR OF SKIN: Status: ACTIVE | Noted: 2022-02-03

## 2022-02-03 PROCEDURE — ? LIQUID NITROGEN

## 2022-02-03 PROCEDURE — ? OTHER

## 2022-02-03 PROCEDURE — ? COUNSELING

## 2022-02-03 PROCEDURE — ? TREATMENT REGIMEN

## 2022-02-03 PROCEDURE — 17110 DESTRUCTION B9 LES UP TO 14: CPT

## 2022-02-03 PROCEDURE — 99213 OFFICE O/P EST LOW 20 MIN: CPT | Mod: 25

## 2022-02-03 ASSESSMENT — LOCATION DETAILED DESCRIPTION DERM
LOCATION DETAILED: LEFT ANTERIOR PROXIMAL THIGH
LOCATION DETAILED: RIGHT MEDIAL BREAST 2-3:00 REGION
LOCATION DETAILED: RIGHT DISTAL PRETIBIAL REGION
LOCATION DETAILED: LEFT DISTAL PRETIBIAL REGION
LOCATION DETAILED: RIGHT ANTERIOR DISTAL THIGH
LOCATION DETAILED: RIGHT MEDIAL FOREHEAD
LOCATION DETAILED: LEFT MEDIAL SUPERIOR CHEST

## 2022-02-03 ASSESSMENT — LOCATION ZONE DERM
LOCATION ZONE: LEG
LOCATION ZONE: TRUNK
LOCATION ZONE: FACE

## 2022-02-03 ASSESSMENT — LOCATION SIMPLE DESCRIPTION DERM
LOCATION SIMPLE: RIGHT FOREHEAD
LOCATION SIMPLE: LEFT THIGH
LOCATION SIMPLE: RIGHT THIGH
LOCATION SIMPLE: LEFT PRETIBIAL REGION
LOCATION SIMPLE: RIGHT BREAST
LOCATION SIMPLE: RIGHT PRETIBIAL REGION
LOCATION SIMPLE: CHEST

## 2022-02-03 NOTE — HPI: FULL BODY SKIN EXAMINATION
What Type Of Note Output Would You Prefer (Optional)?: Standard Output
What Is The Reason For Today's Visit?: Full Body Skin Examination
What Is The Reason For Today's Visit? (Being Monitored For X): concerning skin lesions on an annual basis
How Severe Are Your Spot(S)?: mild
Additional History: Pt gives verbal consent for biopsy.Akhil

## 2022-02-03 NOTE — PROCEDURE: TREATMENT REGIMEN
Detail Level: Detailed
Initiate Treatment: Pepcid 20mg BID, PRN\\nBenadryl , PRN\\nRec pt see allergist in future if continues; however, PCP is going to be ordering labs for her. She wants me do review labs once they are drawn, and she will get them to our office.

## 2022-02-03 NOTE — PROCEDURE: LIQUID NITROGEN
Post-Care Instructions: I reviewed with the patient in detail post-care instructions. Patient is to wear sunprotection, and avoid picking at any of the treated lesions. Pt may apply Vaseline to crusted or scabbing areas.
Consent: The patient's verbal consent was obtained including but not limited to risks of crusting, scabbing, blistering, scarring, darker or lighter pigmentary change, recurrence, incomplete removal and infection.
Show Aperture Variable?: Yes
Render Post-Care Instructions In Note?: no
Number Of Freeze-Thaw Cycles: 1 freeze-thaw cycle
Medical Necessity Information: It is in your best interest to select a reason for this procedure from the list below. All of these items fulfill various CMS LCD requirements except the new and changing color options.
Medical Necessity Clause: Treatment was medically necessary because the spot was rubbed
Spray Paint Text: The liquid nitrogen was applied to the skin utilizing a spray paint frosting technique.
Detail Level: Detailed

## 2022-02-03 NOTE — PROCEDURE: COUNSELING
Detail Level: Zone
Detail Level: Detailed
Detail Level: Simple
Sunscreen Recommendations: Broad Spectrum

## 2022-02-03 NOTE — HPI: RASH
What Type Of Note Output Would You Prefer (Optional)?: Standard Output
How Severe Is Your Rash?: moderate
Is This A New Presentation, Or A Follow-Up?: Rash
Additional History: Rash not present at today’s visit

## 2022-03-18 PROBLEM — N39.3 STRESS INCONTINENCE OF URINE: Status: ACTIVE | Noted: 2018-10-05

## 2022-03-18 PROBLEM — R15.2 FECAL URGENCY: Status: ACTIVE | Noted: 2018-09-17

## 2022-03-19 PROBLEM — R31.29 MICROSCOPIC HEMATURIA: Status: ACTIVE | Noted: 2017-08-08

## 2022-03-19 PROBLEM — N81.10 VAGINAL PROLAPSE: Status: ACTIVE | Noted: 2018-09-17

## 2022-09-09 ENCOUNTER — HOSPITAL ENCOUNTER (OUTPATIENT)
Dept: MAMMOGRAPHY | Age: 67
Discharge: HOME | End: 2022-09-12
Payer: MEDICARE

## 2022-09-09 ENCOUNTER — APPOINTMENT (OUTPATIENT)
Dept: MAMMOGRAPHY | Age: 67
End: 2022-09-09
Payer: MEDICARE

## 2022-09-09 DIAGNOSIS — Z12.31 VISIT FOR SCREENING MAMMOGRAM: ICD-10-CM

## 2022-09-09 DIAGNOSIS — Z78.0 ASYMPTOMATIC MENOPAUSAL STATE: ICD-10-CM

## 2022-09-09 PROCEDURE — 77080 DXA BONE DENSITY AXIAL: CPT

## 2022-09-09 PROCEDURE — 77067 SCR MAMMO BI INCL CAD: CPT

## 2023-02-02 ENCOUNTER — APPOINTMENT (RX ONLY)
Dept: URBAN - METROPOLITAN AREA CLINIC 24 | Facility: CLINIC | Age: 68
Setting detail: DERMATOLOGY
End: 2023-02-02

## 2023-02-02 DIAGNOSIS — L50.1 IDIOPATHIC URTICARIA: ICD-10-CM | Status: INADEQUATELY CONTROLLED

## 2023-02-02 DIAGNOSIS — Z71.89 OTHER SPECIFIED COUNSELING: ICD-10-CM

## 2023-02-02 DIAGNOSIS — L57.8 OTHER SKIN CHANGES DUE TO CHRONIC EXPOSURE TO NONIONIZING RADIATION: ICD-10-CM

## 2023-02-02 PROCEDURE — ? COUNSELING

## 2023-02-02 PROCEDURE — ? SUNSCREEN RECOMMENDATIONS

## 2023-02-02 PROCEDURE — ? PRESCRIPTION MEDICATION MANAGEMENT

## 2023-02-02 PROCEDURE — 99214 OFFICE O/P EST MOD 30 MIN: CPT

## 2023-02-02 PROCEDURE — ? REFERRAL

## 2023-02-02 ASSESSMENT — LOCATION DETAILED DESCRIPTION DERM
LOCATION DETAILED: LEFT POSTERIOR SHOULDER
LOCATION DETAILED: RIGHT POSTERIOR SHOULDER

## 2023-02-02 ASSESSMENT — LOCATION SIMPLE DESCRIPTION DERM
LOCATION SIMPLE: LEFT SHOULDER
LOCATION SIMPLE: RIGHT SHOULDER

## 2023-02-02 ASSESSMENT — LOCATION ZONE DERM: LOCATION ZONE: ARM

## 2023-02-02 NOTE — PROCEDURE: PRESCRIPTION MEDICATION MANAGEMENT
Initiate Treatment: Zyrtec 10mg QAM , Pepcid 20mg BID, may increase to Zyrtec 10mg twice daily if tolerated. She takes a Benadryl but recommended this regimen today.
Detail Level: Zone
Render In Strict Bullet Format?: No
Plan: She complains of hives still happening and wants to see allergy.

## 2023-09-28 ENCOUNTER — TRANSCRIBE ORDERS (OUTPATIENT)
Dept: SCHEDULING | Age: 68
End: 2023-09-28

## 2023-09-28 DIAGNOSIS — Z12.31 SCREENING MAMMOGRAM FOR HIGH-RISK PATIENT: Primary | ICD-10-CM

## 2023-12-28 ENCOUNTER — HOSPITAL ENCOUNTER (OUTPATIENT)
Dept: MAMMOGRAPHY | Age: 68
Discharge: HOME | End: 2023-12-28
Attending: FAMILY MEDICINE
Payer: MEDICARE

## 2023-12-28 VITALS — WEIGHT: 160 LBS | BODY MASS INDEX: 23.7 KG/M2 | HEIGHT: 69 IN

## 2023-12-28 DIAGNOSIS — Z12.31 SCREENING MAMMOGRAM FOR HIGH-RISK PATIENT: ICD-10-CM

## 2023-12-28 PROCEDURE — 77063 BREAST TOMOSYNTHESIS BI: CPT

## 2024-02-01 ENCOUNTER — APPOINTMENT (RX ONLY)
Dept: URBAN - METROPOLITAN AREA CLINIC 24 | Facility: CLINIC | Age: 69
Setting detail: DERMATOLOGY
End: 2024-02-01

## 2024-02-01 DIAGNOSIS — L29.8 OTHER PRURITUS: ICD-10-CM | Status: WELL CONTROLLED

## 2024-02-01 DIAGNOSIS — Z71.89 OTHER SPECIFIED COUNSELING: ICD-10-CM

## 2024-02-01 DIAGNOSIS — L29.89 OTHER PRURITUS: ICD-10-CM | Status: WELL CONTROLLED

## 2024-02-01 DIAGNOSIS — L57.8 OTHER SKIN CHANGES DUE TO CHRONIC EXPOSURE TO NONIONIZING RADIATION: ICD-10-CM

## 2024-02-01 PROCEDURE — ? PRESCRIPTION

## 2024-02-01 PROCEDURE — 99214 OFFICE O/P EST MOD 30 MIN: CPT

## 2024-02-01 PROCEDURE — ? COUNSELING

## 2024-02-01 PROCEDURE — ? PRESCRIPTION MEDICATION MANAGEMENT

## 2024-02-01 PROCEDURE — ? SUNSCREEN RECOMMENDATIONS

## 2024-02-01 RX ORDER — TRIAMCINOLONE ACETONIDE 1 MG/G
CREAM TOPICAL BID
Qty: 15 | Refills: 3 | Status: ERX | COMMUNITY
Start: 2024-02-01

## 2024-02-01 RX ADMIN — TRIAMCINOLONE ACETONIDE: 1 CREAM TOPICAL at 00:00

## 2024-02-01 ASSESSMENT — LOCATION SIMPLE DESCRIPTION DERM
LOCATION SIMPLE: RIGHT SHOULDER
LOCATION SIMPLE: LEFT SHOULDER
LOCATION SIMPLE: ABDOMEN

## 2024-02-01 ASSESSMENT — ITCH INTENSITY: HOW SEVERE IS YOUR ITCHING?: 0

## 2024-02-01 ASSESSMENT — LOCATION DETAILED DESCRIPTION DERM
LOCATION DETAILED: LEFT POSTERIOR SHOULDER
LOCATION DETAILED: RIGHT LATERAL ABDOMEN
LOCATION DETAILED: RIGHT POSTERIOR SHOULDER

## 2024-02-01 ASSESSMENT — LOCATION ZONE DERM
LOCATION ZONE: ARM
LOCATION ZONE: TRUNK

## 2024-02-01 NOTE — PROCEDURE: PRESCRIPTION MEDICATION MANAGEMENT
Detail Level: Zone
Plan: She has had a ACD on her abdomen in the past and history of hives. She is well controlled on Allegra Qd-bid.
Continue Regimen: Triamcinolone cream
Render In Strict Bullet Format?: No

## 2024-02-01 NOTE — PROCEDURE: MIPS QUALITY
Quality 485: Psoriasis - Improvement In Patient-Reported Itch Severity: Itch severity assessment score is reduced by 2 or more points from the initial (index) assessment score to the follow-up visit score
Quality 226: Preventive Care And Screening: Tobacco Use: Screening And Cessation Intervention: Patient screened for tobacco use and is an ex/non-smoker
Detail Level: Detailed
Quality 47: Advance Care Plan: Advance Care Planning discussed and documented; advance care plan or surrogate decision maker documented in the medical record.
Quality 130: Documentation Of Current Medications In The Medical Record: Current Medications Documented

## 2024-02-15 NOTE — PROCEDURE: OTHER
Atrial fibrillation
Render Risk Assessment In Note?: no
Detail Level: Simple
Other (Free Text): Suspect trauma. Pt will RTC if doesn’t heal
Note Text (......Xxx Chief Complaint.): This diagnosis correlates with the

## 2024-12-05 ENCOUNTER — TRANSCRIBE ORDERS (OUTPATIENT)
Dept: SCHEDULING | Age: 69
End: 2024-12-05

## 2024-12-05 DIAGNOSIS — Z12.31 VISIT FOR SCREENING MAMMOGRAM: Primary | ICD-10-CM

## 2025-02-13 ENCOUNTER — APPOINTMENT (OUTPATIENT)
Dept: URBAN - METROPOLITAN AREA CLINIC 24 | Facility: CLINIC | Age: 70
Setting detail: DERMATOLOGY
End: 2025-02-13

## 2025-02-13 DIAGNOSIS — Z71.89 OTHER SPECIFIED COUNSELING: ICD-10-CM

## 2025-02-13 DIAGNOSIS — L57.0 ACTINIC KERATOSIS: ICD-10-CM | Status: INADEQUATELY CONTROLLED

## 2025-02-13 DIAGNOSIS — L29.89 OTHER PRURITUS: ICD-10-CM | Status: STABLE

## 2025-02-13 DIAGNOSIS — L57.8 OTHER SKIN CHANGES DUE TO CHRONIC EXPOSURE TO NONIONIZING RADIATION: ICD-10-CM | Status: STABLE

## 2025-02-13 PROCEDURE — ? PRESCRIPTION

## 2025-02-13 PROCEDURE — 17000 DESTRUCT PREMALG LESION: CPT

## 2025-02-13 PROCEDURE — 99214 OFFICE O/P EST MOD 30 MIN: CPT | Mod: 25

## 2025-02-13 PROCEDURE — ? PRESCRIPTION MEDICATION MANAGEMENT

## 2025-02-13 PROCEDURE — ? COUNSELING

## 2025-02-13 PROCEDURE — ? SUNSCREEN RECOMMENDATIONS

## 2025-02-13 PROCEDURE — ? LIQUID NITROGEN

## 2025-02-13 RX ORDER — TRIAMCINOLONE ACETONIDE 1 MG/G
CREAM TOPICAL BID
Qty: 15 | Refills: 3 | Status: ERX

## 2025-02-13 ASSESSMENT — LOCATION DETAILED DESCRIPTION DERM
LOCATION DETAILED: RIGHT LATERAL ABDOMEN
LOCATION DETAILED: LEFT CENTRAL MALAR CHEEK
LOCATION DETAILED: RIGHT POSTERIOR SHOULDER
LOCATION DETAILED: LEFT POSTERIOR SHOULDER

## 2025-02-13 ASSESSMENT — LOCATION ZONE DERM
LOCATION ZONE: FACE
LOCATION ZONE: TRUNK
LOCATION ZONE: ARM

## 2025-02-13 ASSESSMENT — LOCATION SIMPLE DESCRIPTION DERM
LOCATION SIMPLE: LEFT CHEEK
LOCATION SIMPLE: ABDOMEN
LOCATION SIMPLE: LEFT SHOULDER
LOCATION SIMPLE: RIGHT SHOULDER

## 2025-02-13 NOTE — PROCEDURE: LIQUID NITROGEN
Render Post-Care Instructions In Note?: no
Post-Care Instructions: I reviewed with the patient in detail post-care instructions. Patient is to wear sunprotection, and avoid picking at any of the treated lesions. Pt may apply Vaseline to crusted or scabbing areas.
Consent: The patient's consent was obtained including but not limited to risks of crusting, scabbing, blistering, scarring, darker or lighter pigmentary change, recurrence, incomplete removal and infection.
Application Tool (Optional): Liquid Nitrogen Sprayer
Number Of Freeze-Thaw Cycles: 2 freeze-thaw cycles
Detail Level: Detailed
Show Applicator Variable?: Yes
Aperture Size (Optional): C
Duration Of Freeze Thaw-Cycle (Seconds): 0

## 2025-04-11 ENCOUNTER — HOSPITAL ENCOUNTER (OUTPATIENT)
Dept: MAMMOGRAPHY | Age: 70
Discharge: HOME | End: 2025-04-14
Attending: FAMILY MEDICINE
Payer: MEDICARE

## 2025-04-11 VITALS — HEIGHT: 68 IN | BODY MASS INDEX: 23.04 KG/M2 | WEIGHT: 152 LBS

## 2025-04-11 DIAGNOSIS — Z78.0 MENOPAUSE: ICD-10-CM

## 2025-04-11 DIAGNOSIS — Z12.31 VISIT FOR SCREENING MAMMOGRAM: ICD-10-CM

## 2025-04-11 PROCEDURE — 77080 DXA BONE DENSITY AXIAL: CPT

## 2025-04-11 PROCEDURE — 77063 BREAST TOMOSYNTHESIS BI: CPT
